# Patient Record
Sex: MALE | Race: BLACK OR AFRICAN AMERICAN | NOT HISPANIC OR LATINO
[De-identification: names, ages, dates, MRNs, and addresses within clinical notes are randomized per-mention and may not be internally consistent; named-entity substitution may affect disease eponyms.]

---

## 2018-01-30 PROBLEM — Z00.00 ENCOUNTER FOR PREVENTIVE HEALTH EXAMINATION: Status: ACTIVE | Noted: 2018-01-30

## 2018-02-07 ENCOUNTER — FORM ENCOUNTER (OUTPATIENT)
Age: 48
End: 2018-02-07

## 2018-02-08 ENCOUNTER — OUTPATIENT (OUTPATIENT)
Dept: OUTPATIENT SERVICES | Facility: HOSPITAL | Age: 48
LOS: 1 days | End: 2018-02-08
Payer: COMMERCIAL

## 2018-02-08 ENCOUNTER — APPOINTMENT (OUTPATIENT)
Dept: ORTHOPEDIC SURGERY | Facility: CLINIC | Age: 48
End: 2018-02-08
Payer: COMMERCIAL

## 2018-02-08 VITALS — HEIGHT: 69 IN | WEIGHT: 239 LBS | BODY MASS INDEX: 35.4 KG/M2

## 2018-02-08 DIAGNOSIS — Z72.0 TOBACCO USE: ICD-10-CM

## 2018-02-08 DIAGNOSIS — Z72.3 LACK OF PHYSICAL EXERCISE: ICD-10-CM

## 2018-02-08 PROCEDURE — 72020 X-RAY EXAM OF SPINE 1 VIEW: CPT | Mod: 26

## 2018-02-08 PROCEDURE — 73502 X-RAY EXAM HIP UNI 2-3 VIEWS: CPT

## 2018-02-08 PROCEDURE — 73502 X-RAY EXAM HIP UNI 2-3 VIEWS: CPT | Mod: 26,LT

## 2018-02-08 PROCEDURE — 99204 OFFICE O/P NEW MOD 45 MIN: CPT

## 2018-02-08 PROCEDURE — 72020 X-RAY EXAM OF SPINE 1 VIEW: CPT

## 2018-03-21 ENCOUNTER — APPOINTMENT (OUTPATIENT)
Dept: HEART AND VASCULAR | Facility: CLINIC | Age: 48
End: 2018-03-21

## 2018-07-08 ENCOUNTER — FORM ENCOUNTER (OUTPATIENT)
Age: 48
End: 2018-07-08

## 2019-01-17 ENCOUNTER — FORM ENCOUNTER (OUTPATIENT)
Age: 49
End: 2019-01-17

## 2019-01-18 ENCOUNTER — APPOINTMENT (OUTPATIENT)
Dept: ORTHOPEDIC SURGERY | Facility: CLINIC | Age: 49
End: 2019-01-18
Payer: COMMERCIAL

## 2019-01-18 ENCOUNTER — OUTPATIENT (OUTPATIENT)
Dept: OUTPATIENT SERVICES | Facility: HOSPITAL | Age: 49
LOS: 1 days | End: 2019-01-18
Payer: COMMERCIAL

## 2019-01-18 VITALS — HEIGHT: 69 IN | BODY MASS INDEX: 37.33 KG/M2 | WEIGHT: 252 LBS

## 2019-01-18 DIAGNOSIS — E66.9 OBESITY, UNSPECIFIED: ICD-10-CM

## 2019-01-18 DIAGNOSIS — Z71.6 TOBACCO ABUSE COUNSELING: ICD-10-CM

## 2019-01-18 DIAGNOSIS — M16.11 UNILATERAL PRIMARY OSTEOARTHRITIS, RIGHT HIP: ICD-10-CM

## 2019-01-18 DIAGNOSIS — M16.12 UNILATERAL PRIMARY OSTEOARTHRITIS, LEFT HIP: ICD-10-CM

## 2019-01-18 PROCEDURE — 73502 X-RAY EXAM HIP UNI 2-3 VIEWS: CPT

## 2019-01-18 PROCEDURE — 99214 OFFICE O/P EST MOD 30 MIN: CPT

## 2019-01-18 PROCEDURE — 73502 X-RAY EXAM HIP UNI 2-3 VIEWS: CPT | Mod: 26,LT

## 2019-01-18 NOTE — DISCUSSION/SUMMARY
[de-identified] : Patient has progressively severe hip pain and disability secondary to DJD and has failed extensive conservative care including activity modification, analgesic medications and therapeutic exercise. The patient was indicated for left total hip replacement.\par We discussed the details of the procedure, the expected recovery period, and the expected outcome. Bearing surface and fixation options were discussed, along with surgical approaches. For this patient I recommended a posterior approach to ensure adequate surgical exposure.\par We discussed the likelihood of satisfaction after complete recovery, and the potential causes of dissatisfaction. Specific risks of total hip replacement were discussed in detail. We discussed the risk of surgical site complications including but not limited to: surgical site infection, wound healing complications, bone fracture, prosthetic hip dislocation, neurovascular injury, hemorrhage, limb length inequality, persistent pain and need for reoperation. We discussed surgical blood loss and the possible need for blood transfusion. We discussed the risk of perioperative medical complications, including but not limited to catheter-associated urinary tract infection, venous thromboembolism and other cardiopulmonary complications. We discussed anesthetic options and the risk of anesthesia-related complications. We discussed the durability of prosthetic hips and limitations related to wear, osteolysis and loosening. The patient was given a copy of my preoperative packet with additional information about the procedure.\par \par Patient has been trying to quit smoking and I encouraged him not to keep cigarettes in the house. I will test for nicotine before the surgery and told him I won't operate if his nicotine level is high. I recommended he schedule surgery after being nicotine free for several weeks to months. \par \par The patient gave informed consent for the surgical procedure and was instructed to speak to my surgical coordinator to arrange the logistics of surgical scheduling, presurgical testing, and medical optimization and clearance.

## 2019-01-18 NOTE — PHYSICAL EXAM
[de-identified] : Constitutional: Well appearing. No acute distress.\par Mental Status: Alert & oriented to person, place and time. Normal affect.\par Pulmonary: No respiratory distress. Normal chest excursion.\par \par Gait: Severely coxalgic\par Ambulatory assist devices: Cane.\par \par Cervical spine: Skin intact. No visible deformity. Painless active ROM without evident restriction.\par Bilateral upper extremities: Skin intact. No deformity. Painless active ROM without evident restriction.\par Thoracolumbar spine: No deformity. No tenderness. No radicular pain on passive straight leg raise bilaterally.\par Pelvis: No pelvic obliquity. No tenderness.\par Leg lengths: Left a few mm shorter than right.\par \par Right Hip:\par Skin intact.\par No surgical scars.\par No erythema.\par No ecchymosis.\par No swelling.\par No deformity.\par No focal tenderness.\par Painful and restricted range of motion. ROM from full extension to 75 degrees of flexion. 0 degrees of internal rotation. 20 degrees of external rotation. 10 degrees of abduction. 0 degrees of adduction.\par No crepitation.\par No instability.\par PILAR painful.\par Impingement (FADIR) painful.\par Stinchfield painful.\par Flexor power 4+/5\par \par Left Hip:\par Skin intact.\par No surgical scars.\par No erythema.\par No ecchymosis.\par No swelling.\par No deformity.\par No focal tenderness.\par Painful and restricted range of motion. ROM from full extension to 60 degrees of flexion. 10 degrees of obligate external rotation to further 15 degrees of external rotation. 10 degrees of abduction. 0 degrees of adduction.\par No crepitation.\par No instability.\par PILAR painful.\par Impingement (FADIR) painful.\par Stinchfield painful.\par Flexor power 5/5.\par \par Bilateral Knees: Skin intact. No surgical scars. No erythema or ecchymosis. No swelling or effusion. No deformity. No focal tenderness. Painless and unrestricted range of motion. Central patellar tracking. No crepitation. No instability. Quadriceps 5-/5 on right and 4+/5 on left.\par \par Neurological: Intact distal crude touch sensation. Normal distal motor power. \par Cardiovascular: Palpable dorsalis pedis and posterior tibialis pulses. Brisk capillary refill. No peripheral edema.\par Lymphatics: No peripheral adenopathy appreciated. 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

## 2019-01-18 NOTE — REASON FOR VISIT
Normal rate, regular rhythm, normal S1, S2 heart sounds heard. [_______] : lincoln CORREA  for [Hip Pain] : hip pain

## 2019-01-18 NOTE — ADDENDUM
[FreeTextEntry1] : This note was written by Mitzi King on 01/18/2019 acting as scribe for Dr. Costello and JEREMY Cavanaugh.\par \par \par \par

## 2019-01-18 NOTE — HISTORY OF PRESENT ILLNESS
[Worsening] : worsening [___ yrs] : [unfilled] year(s) ago [Standing] : standing [Lifting] : lifting [Constant] : ~He/She~ states the symptoms seem to be constant [Hip Movement] : worsened by hip movement [Sitting] : worsened by sitting [Walking] : worsened by walking [de-identified] : 49 y/o M smoker presents today for follow up of left hip pain. He reports marked pain that seriously limits his activity. Patient experiences pain daily that makes it difficult for him to place shoes on the left foot. He is able to walk less than 5 blocks with a severe limp and has difficulty using stairs. Patient uses a cane for long walks and reports that he is unable to sit comfortably in any chair. He has been taking Advil and Ibuprofen daily to manage pain.\par At his last visit in February of 2018, patient was told to cease nicotine use before the operation and he reports that he has reduced his smoking to two cigarettes daily.

## 2019-05-14 ENCOUNTER — OTHER (OUTPATIENT)
Age: 49
End: 2019-05-14

## 2019-05-19 ENCOUNTER — FORM ENCOUNTER (OUTPATIENT)
Age: 49
End: 2019-05-19

## 2019-05-20 ENCOUNTER — APPOINTMENT (OUTPATIENT)
Dept: CT IMAGING | Facility: HOSPITAL | Age: 49
End: 2019-05-20
Payer: COMMERCIAL

## 2019-05-20 ENCOUNTER — OUTPATIENT (OUTPATIENT)
Dept: OUTPATIENT SERVICES | Facility: HOSPITAL | Age: 49
LOS: 1 days | End: 2019-05-20
Payer: COMMERCIAL

## 2019-05-20 DIAGNOSIS — Z22.321 CARRIER OR SUSPECTED CARRIER OF METHICILLIN SUSCEPTIBLE STAPHYLOCOCCUS AUREUS: ICD-10-CM

## 2019-05-20 LAB
MRSA PCR RESULT.: NEGATIVE — SIGNIFICANT CHANGE UP
S AUREUS DNA NOSE QL NAA+PROBE: POSITIVE

## 2019-05-20 PROCEDURE — 73700 CT LOWER EXTREMITY W/O DYE: CPT

## 2019-05-20 PROCEDURE — 73700 CT LOWER EXTREMITY W/O DYE: CPT | Mod: 26,LT

## 2019-05-20 PROCEDURE — 87641 MR-STAPH DNA AMP PROBE: CPT

## 2019-06-04 ENCOUNTER — RESULT REVIEW (OUTPATIENT)
Age: 49
End: 2019-06-04

## 2019-06-04 VITALS
TEMPERATURE: 98 F | OXYGEN SATURATION: 99 % | RESPIRATION RATE: 18 BRPM | HEIGHT: 69 IN | WEIGHT: 248.9 LBS | HEART RATE: 84 BPM | SYSTOLIC BLOOD PRESSURE: 145 MMHG | DIASTOLIC BLOOD PRESSURE: 95 MMHG

## 2019-06-04 NOTE — H&P ADULT - NSHPPHYSICALEXAM_GEN_ALL_CORE
MSK:  decreased range of motion left hip 2/2 pain  Remainder of physical exam as per medical clearance note MSK: Skin warm and well perfused. DP pulses palpable bilateral lower extremities. Sensation intact and equal bilateral lower extremities. EHL/TA/GS/FHL 5/5 bilateral lower extremities.   decreased range of motion left hip 2/2 pain  Remainder of physical exam as per medical clearance note

## 2019-06-04 NOTE — H&P ADULT - NSHPLABSRESULTS_GEN_ALL_CORE
preop CBC/CMP/PT/INR/PTT/UA/urine culture - wnl reviewed by medical clearance  Preop EKG: NSR, reviewed by medical clearance.   Echocardiogram transthoracic: unremarkable study, reviewed by cardiology clearance  Nares swab: +MSSA preop CBC/CMP/PT/INR/PTT/UA/urine culture - wnl reviewed by medical clearance  Preop EKG: NSR, reviewed by medical clearance.   Echocardiogram transthoracic: unremarkable study, reviewed by cardiology clearance  Nares swab: +MSSA - pt completed 5 day treatment w/ mupirocin ointment

## 2019-06-04 NOTE — H&P ADULT - PROBLEM SELECTOR PLAN 1
Admit to Orthopaedic Service.  Presents today for elective left total hip replacement   Pt medically stable and cleared for procedure today by  Admit to Orthopaedic Service.  Presents today for elective left total hip replacement   Pt medically stable and cleared for procedure today by Dr. Marcial; cardiology clearance per Dr. Yo

## 2019-06-04 NOTE — H&P ADULT - HISTORY OF PRESENT ILLNESS
49 year old male presents with left hip pain x   presents today for elective left total hip replacement, robotic assisted 49 year old male presents with left hip pain x 3-4 years without preceding accident, injury or trauma. The patient complains of pain which radiates down his left lower extremity and is associated with numbness/tingling of the LLE. Per chart review, the patient has a hx of a L hip SCFE. The patient occasionally ambulates with assistance of a cane. The patient failed conservative therapies for his symptoms. Occasionally takes aleve as needed for her pain. Denies hx of DVT.   presents today for elective left total hip replacement, robotic assisted

## 2019-06-05 ENCOUNTER — INPATIENT (INPATIENT)
Facility: HOSPITAL | Age: 49
LOS: 0 days | Discharge: HOME HEALTH | DRG: 470 | End: 2019-06-06
Attending: ORTHOPAEDIC SURGERY | Admitting: ORTHOPAEDIC SURGERY
Payer: COMMERCIAL

## 2019-06-05 ENCOUNTER — APPOINTMENT (OUTPATIENT)
Dept: ORTHOPEDIC SURGERY | Facility: HOSPITAL | Age: 49
End: 2019-06-05

## 2019-06-05 ENCOUNTER — MEDICATION RENEWAL (OUTPATIENT)
Age: 49
End: 2019-06-05

## 2019-06-05 DIAGNOSIS — M19.90 UNSPECIFIED OSTEOARTHRITIS, UNSPECIFIED SITE: ICD-10-CM

## 2019-06-05 LAB
ANION GAP SERPL CALC-SCNC: 9 MMOL/L — SIGNIFICANT CHANGE UP (ref 5–17)
BUN SERPL-MCNC: 15 MG/DL — SIGNIFICANT CHANGE UP (ref 7–23)
CALCIUM SERPL-MCNC: 9.2 MG/DL — SIGNIFICANT CHANGE UP (ref 8.4–10.5)
CHLORIDE SERPL-SCNC: 103 MMOL/L — SIGNIFICANT CHANGE UP (ref 96–108)
CO2 SERPL-SCNC: 27 MMOL/L — SIGNIFICANT CHANGE UP (ref 22–31)
CREAT SERPL-MCNC: 1.06 MG/DL — SIGNIFICANT CHANGE UP (ref 0.5–1.3)
GLUCOSE SERPL-MCNC: 93 MG/DL — SIGNIFICANT CHANGE UP (ref 70–99)
HCT VFR BLD CALC: 39.9 % — SIGNIFICANT CHANGE UP (ref 39–50)
HGB BLD-MCNC: 11.7 G/DL — LOW (ref 13–17)
MCHC RBC-ENTMCNC: 28.1 PG — SIGNIFICANT CHANGE UP (ref 27–34)
MCHC RBC-ENTMCNC: 29.3 GM/DL — LOW (ref 32–36)
MCV RBC AUTO: 95.7 FL — SIGNIFICANT CHANGE UP (ref 80–100)
NRBC # BLD: 0 /100 WBCS — SIGNIFICANT CHANGE UP (ref 0–0)
PLATELET # BLD AUTO: 239 K/UL — SIGNIFICANT CHANGE UP (ref 150–400)
POTASSIUM SERPL-MCNC: 3.7 MMOL/L — SIGNIFICANT CHANGE UP (ref 3.5–5.3)
POTASSIUM SERPL-SCNC: 3.7 MMOL/L — SIGNIFICANT CHANGE UP (ref 3.5–5.3)
RBC # BLD: 4.17 M/UL — LOW (ref 4.2–5.8)
RBC # FLD: 14.1 % — SIGNIFICANT CHANGE UP (ref 10.3–14.5)
SODIUM SERPL-SCNC: 139 MMOL/L — SIGNIFICANT CHANGE UP (ref 135–145)
WBC # BLD: 13.22 K/UL — HIGH (ref 3.8–10.5)
WBC # FLD AUTO: 13.22 K/UL — HIGH (ref 3.8–10.5)

## 2019-06-05 PROCEDURE — 20985 CPTR-ASST DIR MS PX: CPT

## 2019-06-05 PROCEDURE — 27130 TOTAL HIP ARTHROPLASTY: CPT | Mod: LT

## 2019-06-05 PROCEDURE — 72170 X-RAY EXAM OF PELVIS: CPT | Mod: 26

## 2019-06-05 RX ORDER — CEFAZOLIN SODIUM 1 G
2000 VIAL (EA) INJECTION EVERY 8 HOURS
Refills: 0 | Status: DISCONTINUED | OUTPATIENT
Start: 2019-06-05 | End: 2019-06-05

## 2019-06-05 RX ORDER — SENNA PLUS 8.6 MG/1
2 TABLET ORAL AT BEDTIME
Refills: 0 | Status: DISCONTINUED | OUTPATIENT
Start: 2019-06-05 | End: 2019-06-06

## 2019-06-05 RX ORDER — CELECOXIB 200 MG/1
200 CAPSULE ORAL
Refills: 0 | Status: DISCONTINUED | OUTPATIENT
Start: 2019-06-07 | End: 2019-06-06

## 2019-06-05 RX ORDER — AMLODIPINE BESYLATE 2.5 MG/1
5 TABLET ORAL DAILY
Refills: 0 | Status: DISCONTINUED | OUTPATIENT
Start: 2019-06-05 | End: 2019-06-06

## 2019-06-05 RX ORDER — CEFAZOLIN SODIUM 1 G
2000 VIAL (EA) INJECTION EVERY 8 HOURS
Refills: 0 | Status: COMPLETED | OUTPATIENT
Start: 2019-06-05 | End: 2019-06-06

## 2019-06-05 RX ORDER — KETOROLAC TROMETHAMINE 30 MG/ML
15 SYRINGE (ML) INJECTION EVERY 6 HOURS
Refills: 0 | Status: DISCONTINUED | OUTPATIENT
Start: 2019-06-05 | End: 2019-06-06

## 2019-06-05 RX ORDER — OLMESARTAN MEDOXOMIL 5 MG/1
1 TABLET, FILM COATED ORAL
Qty: 0 | Refills: 0 | DISCHARGE

## 2019-06-05 RX ORDER — OXYCODONE HYDROCHLORIDE 5 MG/1
5 TABLET ORAL EVERY 4 HOURS
Refills: 0 | Status: DISCONTINUED | OUTPATIENT
Start: 2019-06-05 | End: 2019-06-06

## 2019-06-05 RX ORDER — ACETAMINOPHEN 500 MG
650 TABLET ORAL EVERY 6 HOURS
Refills: 0 | Status: DISCONTINUED | OUTPATIENT
Start: 2019-06-05 | End: 2019-06-06

## 2019-06-05 RX ORDER — MORPHINE SULFATE 50 MG/1
4 CAPSULE, EXTENDED RELEASE ORAL EVERY 4 HOURS
Refills: 0 | Status: DISCONTINUED | OUTPATIENT
Start: 2019-06-05 | End: 2019-06-06

## 2019-06-05 RX ORDER — APREPITANT 80 MG/1
40 CAPSULE ORAL ONCE
Refills: 0 | Status: COMPLETED | OUTPATIENT
Start: 2019-06-05 | End: 2019-06-05

## 2019-06-05 RX ORDER — ASPIRIN/CALCIUM CARB/MAGNESIUM 324 MG
325 TABLET ORAL
Refills: 0 | Status: DISCONTINUED | OUTPATIENT
Start: 2019-06-05 | End: 2019-06-06

## 2019-06-05 RX ORDER — SODIUM CHLORIDE 9 MG/ML
1000 INJECTION, SOLUTION INTRAVENOUS
Refills: 0 | Status: DISCONTINUED | OUTPATIENT
Start: 2019-06-05 | End: 2019-06-06

## 2019-06-05 RX ORDER — ACETAMINOPHEN 500 MG
1000 TABLET ORAL ONCE
Refills: 0 | Status: COMPLETED | OUTPATIENT
Start: 2019-06-05 | End: 2019-06-05

## 2019-06-05 RX ORDER — CELECOXIB 200 MG/1
400 CAPSULE ORAL ONCE
Refills: 0 | Status: COMPLETED | OUTPATIENT
Start: 2019-06-05 | End: 2019-06-05

## 2019-06-05 RX ORDER — LOSARTAN POTASSIUM 100 MG/1
100 TABLET, FILM COATED ORAL DAILY
Refills: 0 | Status: DISCONTINUED | OUTPATIENT
Start: 2019-06-05 | End: 2019-06-06

## 2019-06-05 RX ORDER — AMLODIPINE BESYLATE 2.5 MG/1
1 TABLET ORAL
Qty: 0 | Refills: 0 | DISCHARGE

## 2019-06-05 RX ORDER — ONDANSETRON 8 MG/1
4 TABLET, FILM COATED ORAL EVERY 6 HOURS
Refills: 0 | Status: DISCONTINUED | OUTPATIENT
Start: 2019-06-05 | End: 2019-06-06

## 2019-06-05 RX ORDER — MORPHINE SULFATE 50 MG/1
4 CAPSULE, EXTENDED RELEASE ORAL
Refills: 0 | Status: DISCONTINUED | OUTPATIENT
Start: 2019-06-05 | End: 2019-06-06

## 2019-06-05 RX ORDER — POLYETHYLENE GLYCOL 3350 17 G/17G
17 POWDER, FOR SOLUTION ORAL DAILY
Refills: 0 | Status: DISCONTINUED | OUTPATIENT
Start: 2019-06-05 | End: 2019-06-06

## 2019-06-05 RX ORDER — BUPIVACAINE 13.3 MG/ML
20 INJECTION, SUSPENSION, LIPOSOMAL INFILTRATION ONCE
Refills: 0 | Status: DISCONTINUED | OUTPATIENT
Start: 2019-06-05 | End: 2019-06-06

## 2019-06-05 RX ORDER — DOCUSATE SODIUM 100 MG
100 CAPSULE ORAL THREE TIMES A DAY
Refills: 0 | Status: DISCONTINUED | OUTPATIENT
Start: 2019-06-05 | End: 2019-06-06

## 2019-06-05 RX ORDER — PANTOPRAZOLE SODIUM 20 MG/1
40 TABLET, DELAYED RELEASE ORAL
Refills: 0 | Status: DISCONTINUED | OUTPATIENT
Start: 2019-06-05 | End: 2019-06-06

## 2019-06-05 RX ORDER — OXYCODONE HYDROCHLORIDE 5 MG/1
10 TABLET ORAL EVERY 4 HOURS
Refills: 0 | Status: DISCONTINUED | OUTPATIENT
Start: 2019-06-05 | End: 2019-06-06

## 2019-06-05 RX ADMIN — APREPITANT 40 MILLIGRAM(S): 80 CAPSULE ORAL at 10:35

## 2019-06-05 RX ADMIN — AMLODIPINE BESYLATE 5 MILLIGRAM(S): 2.5 TABLET ORAL at 20:53

## 2019-06-05 RX ADMIN — Medication 2000 MILLIGRAM(S): at 22:02

## 2019-06-05 RX ADMIN — LOSARTAN POTASSIUM 100 MILLIGRAM(S): 100 TABLET, FILM COATED ORAL at 20:54

## 2019-06-05 RX ADMIN — Medication 325 MILLIGRAM(S): at 22:02

## 2019-06-05 RX ADMIN — OXYCODONE HYDROCHLORIDE 10 MILLIGRAM(S): 5 TABLET ORAL at 22:06

## 2019-06-05 RX ADMIN — OXYCODONE HYDROCHLORIDE 10 MILLIGRAM(S): 5 TABLET ORAL at 06:34

## 2019-06-05 RX ADMIN — CELECOXIB 400 MILLIGRAM(S): 200 CAPSULE ORAL at 10:35

## 2019-06-05 RX ADMIN — Medication 100 MILLIGRAM(S): at 22:02

## 2019-06-05 RX ADMIN — Medication 1000 MILLIGRAM(S): at 10:35

## 2019-06-05 NOTE — PROGRESS NOTE ADULT - SUBJECTIVE AND OBJECTIVE BOX
Orthopedics Post Op Check    Procedure: LEFT THR  Surgeon: SHIVANI    Pt. stable, laying comfortably in bed. Denies CP, SOB, N/V, numbness/tingling in b/l les.     Vital Signs Last 24 Hrs  T(C): 36.1 (05 Jun 2019 17:20), Max: 36.1 (05 Jun 2019 15:20)  T(F): 97 (05 Jun 2019 17:20), Max: 97 (05 Jun 2019 15:20)  HR: 68 (05 Jun 2019 17:20) (60 - 70)  BP: 159/86 (05 Jun 2019 17:20) (148/78 - 171/88)  BP(mean): 114 (05 Jun 2019 17:20) (105 - 123)  RR: 20 (05 Jun 2019 17:20) (8 - 20)  SpO2: 98% (05 Jun 2019 17:20) (98% - 100%)      Dressing C/D/I    Pulses: DP/PT 2+B/L LES  SLT: intact   B/L LES  Motor:  EHL/FHL/TA/GS  5/5 b/l les    Post op XR: prosthesis in place     A/P: 49yoMale POD#0 s/p LEFT THR POSTERIOR  - Stable  - Pain Control  - DVT ppx: ASA  - Post op abx:ANCEF  - PT, WBS: WBAT WITH POSTERIOR HIP PRECAUTIONS  - F/U AM Labs

## 2019-06-05 NOTE — PHYSICAL THERAPY INITIAL EVALUATION ADULT - PLANNED THERAPY INTERVENTIONS, PT EVAL
balance training/transfer training/bed mobility training/gait training/postural re-education/strengthening/neuromuscular re-education

## 2019-06-05 NOTE — PHYSICAL THERAPY INITIAL EVALUATION ADULT - CRITERIA FOR SKILLED THERAPEUTIC INTERVENTIONS
predicted duration of therapy intervention/anticipated equipment needs at discharge/anticipated discharge recommendation/impairments found/therapy frequency/functional limitations in following categories/risk reduction/prevention

## 2019-06-05 NOTE — PHYSICAL THERAPY INITIAL EVALUATION ADULT - PERTINENT HX OF CURRENT PROBLEM, REHAB EVAL
49 year old male presents with left hip pain x 3-4 years without preceding accident, injury or trauma. The patient complains of pain which radiates down his left lower extremity and is associated with numbness/tingling of the LLE. Per chart review, the patient has a hx of a L hip SCFE. The patient occasionally ambulates with assistance of a cane. The patient failed conservative therapies for his symptoms. Occasionally takes aleve as needed for her pain. Denies hx of DVT.

## 2019-06-05 NOTE — PHYSICAL THERAPY INITIAL EVALUATION ADULT - ADDITIONAL COMMENTS
Patient lives with spouse with 23y.o son in apartment building with no steps. Sometimes uses SC. Denies Hx of falls.

## 2019-06-05 NOTE — PHYSICAL THERAPY INITIAL EVALUATION ADULT - GENERAL OBSERVATIONS, REHAB EVAL
Patient received semi-supine in no acute distress c/o of headache, +IV, +SCDs, +Abduction pillow, +O2. Cleared by JED Yepez .

## 2019-06-06 ENCOUNTER — TRANSCRIPTION ENCOUNTER (OUTPATIENT)
Age: 49
End: 2019-06-06

## 2019-06-06 VITALS — DIASTOLIC BLOOD PRESSURE: 69 MMHG | HEART RATE: 72 BPM | SYSTOLIC BLOOD PRESSURE: 149 MMHG

## 2019-06-06 LAB
ANION GAP SERPL CALC-SCNC: 11 MMOL/L — SIGNIFICANT CHANGE UP (ref 5–17)
BUN SERPL-MCNC: 16 MG/DL — SIGNIFICANT CHANGE UP (ref 7–23)
CALCIUM SERPL-MCNC: 9 MG/DL — SIGNIFICANT CHANGE UP (ref 8.4–10.5)
CHLORIDE SERPL-SCNC: 101 MMOL/L — SIGNIFICANT CHANGE UP (ref 96–108)
CO2 SERPL-SCNC: 24 MMOL/L — SIGNIFICANT CHANGE UP (ref 22–31)
CREAT SERPL-MCNC: 0.95 MG/DL — SIGNIFICANT CHANGE UP (ref 0.5–1.3)
GLUCOSE SERPL-MCNC: 113 MG/DL — HIGH (ref 70–99)
HCT VFR BLD CALC: 35.2 % — LOW (ref 39–50)
HGB BLD-MCNC: 10.7 G/DL — LOW (ref 13–17)
MCHC RBC-ENTMCNC: 28.3 PG — SIGNIFICANT CHANGE UP (ref 27–34)
MCHC RBC-ENTMCNC: 30.4 GM/DL — LOW (ref 32–36)
MCV RBC AUTO: 93.1 FL — SIGNIFICANT CHANGE UP (ref 80–100)
NRBC # BLD: 0 /100 WBCS — SIGNIFICANT CHANGE UP (ref 0–0)
PLATELET # BLD AUTO: 247 K/UL — SIGNIFICANT CHANGE UP (ref 150–400)
POTASSIUM SERPL-MCNC: 4.1 MMOL/L — SIGNIFICANT CHANGE UP (ref 3.5–5.3)
POTASSIUM SERPL-SCNC: 4.1 MMOL/L — SIGNIFICANT CHANGE UP (ref 3.5–5.3)
RBC # BLD: 3.78 M/UL — LOW (ref 4.2–5.8)
RBC # FLD: 14 % — SIGNIFICANT CHANGE UP (ref 10.3–14.5)
SODIUM SERPL-SCNC: 136 MMOL/L — SIGNIFICANT CHANGE UP (ref 135–145)
WBC # BLD: 12.1 K/UL — HIGH (ref 3.8–10.5)
WBC # FLD AUTO: 12.1 K/UL — HIGH (ref 3.8–10.5)

## 2019-06-06 PROCEDURE — 99223 1ST HOSP IP/OBS HIGH 75: CPT

## 2019-06-06 PROCEDURE — 99222 1ST HOSP IP/OBS MODERATE 55: CPT

## 2019-06-06 RX ORDER — ASPIRIN/CALCIUM CARB/MAGNESIUM 324 MG
1 TABLET ORAL
Qty: 0 | Refills: 0 | DISCHARGE
Start: 2019-06-06

## 2019-06-06 RX ORDER — AMLODIPINE BESYLATE 2.5 MG/1
5 TABLET ORAL ONCE
Refills: 0 | Status: COMPLETED | OUTPATIENT
Start: 2019-06-06 | End: 2019-06-06

## 2019-06-06 RX ORDER — AMLODIPINE BESYLATE 2.5 MG/1
1 TABLET ORAL
Qty: 30 | Refills: 0
Start: 2019-06-06 | End: 2019-07-05

## 2019-06-06 RX ORDER — AMLODIPINE BESYLATE 2.5 MG/1
1 TABLET ORAL
Qty: 0 | Refills: 0 | DISCHARGE

## 2019-06-06 RX ORDER — DOCUSATE SODIUM 100 MG
1 CAPSULE ORAL
Qty: 0 | Refills: 0 | DISCHARGE
Start: 2019-06-06

## 2019-06-06 RX ORDER — CELECOXIB 200 MG/1
1 CAPSULE ORAL
Qty: 0 | Refills: 0 | DISCHARGE
Start: 2019-06-06

## 2019-06-06 RX ORDER — AMLODIPINE BESYLATE 2.5 MG/1
10 TABLET ORAL DAILY
Refills: 0 | Status: DISCONTINUED | OUTPATIENT
Start: 2019-06-07 | End: 2019-06-06

## 2019-06-06 RX ORDER — POLYETHYLENE GLYCOL 3350 17 G/17G
17 POWDER, FOR SOLUTION ORAL
Qty: 0 | Refills: 0 | DISCHARGE
Start: 2019-06-06

## 2019-06-06 RX ORDER — SENNA PLUS 8.6 MG/1
2 TABLET ORAL
Qty: 0 | Refills: 0 | DISCHARGE
Start: 2019-06-06

## 2019-06-06 RX ORDER — PANTOPRAZOLE SODIUM 20 MG/1
1 TABLET, DELAYED RELEASE ORAL
Qty: 0 | Refills: 0 | DISCHARGE
Start: 2019-06-06

## 2019-06-06 RX ADMIN — Medication 100 MILLIGRAM(S): at 05:27

## 2019-06-06 RX ADMIN — OXYCODONE HYDROCHLORIDE 10 MILLIGRAM(S): 5 TABLET ORAL at 05:34

## 2019-06-06 RX ADMIN — Medication 15 MILLIGRAM(S): at 00:18

## 2019-06-06 RX ADMIN — OXYCODONE HYDROCHLORIDE 10 MILLIGRAM(S): 5 TABLET ORAL at 15:56

## 2019-06-06 RX ADMIN — Medication 100 MILLIGRAM(S): at 12:53

## 2019-06-06 RX ADMIN — Medication 15 MILLIGRAM(S): at 12:53

## 2019-06-06 RX ADMIN — Medication 2000 MILLIGRAM(S): at 05:27

## 2019-06-06 RX ADMIN — Medication 325 MILLIGRAM(S): at 05:27

## 2019-06-06 RX ADMIN — AMLODIPINE BESYLATE 5 MILLIGRAM(S): 2.5 TABLET ORAL at 05:27

## 2019-06-06 RX ADMIN — Medication 15 MILLIGRAM(S): at 05:28

## 2019-06-06 RX ADMIN — AMLODIPINE BESYLATE 5 MILLIGRAM(S): 2.5 TABLET ORAL at 15:21

## 2019-06-06 RX ADMIN — Medication 15 MILLIGRAM(S): at 01:18

## 2019-06-06 RX ADMIN — POLYETHYLENE GLYCOL 3350 17 GRAM(S): 17 POWDER, FOR SOLUTION ORAL at 12:53

## 2019-06-06 RX ADMIN — OXYCODONE HYDROCHLORIDE 10 MILLIGRAM(S): 5 TABLET ORAL at 10:44

## 2019-06-06 RX ADMIN — PANTOPRAZOLE SODIUM 40 MILLIGRAM(S): 20 TABLET, DELAYED RELEASE ORAL at 05:27

## 2019-06-06 RX ADMIN — LOSARTAN POTASSIUM 100 MILLIGRAM(S): 100 TABLET, FILM COATED ORAL at 05:27

## 2019-06-06 NOTE — PROGRESS NOTE ADULT - SUBJECTIVE AND OBJECTIVE BOX
S: Patient seen and examined. Doing well this AM. Pain reported but controlled. No acute events overnight.    O:   Vital Signs Last 24 Hrs  T(C): 36.4 (06 Jun 2019 04:20), Max: 36.7 (06 Jun 2019 00:29)  T(F): 97.5 (06 Jun 2019 04:20), Max: 98.1 (06 Jun 2019 00:29)  HR: 71 (06 Jun 2019 04:20) (60 - 90)  BP: 143/81 (06 Jun 2019 04:20) (143/78 - 200/97)  BP(mean): 114 (05 Jun 2019 17:20) (105 - 123)  RR: 16 (06 Jun 2019 04:20) (8 - 20)  SpO2: 98% (06 Jun 2019 04:20) (96% - 100%)    Motor: intact GS/TA/EHL/FHL BL   SILT to patients baseline BL  WWP DP PT BL  Dressing C/D/I

## 2019-06-06 NOTE — DISCHARGE NOTE PROVIDER - NSDCFUADDAPPT_GEN_ALL_CORE_FT
Follow up with your primary care doctor regarding your high blood pressure and your medication change. Amlodipine (Norvasc was changed to 10mg daily).

## 2019-06-06 NOTE — CONSULT NOTE ADULT - SUBJECTIVE AND OBJECTIVE BOX
CC: No overnight events, no complaints.   Feeling well, pain is overall controlled.  Tolerates PO diet (+); Urination (+); Walked with PT (+)  Denies cp, sob, dizziness, HA, abdominal pain, n/v.  Rest of ROS negative.     Vital Signs Last 24 Hrs  T(C): 36.3 (06 Jun 2019 15:28), Max: 36.7 (06 Jun 2019 00:29)  T(F): 97.3 (06 Jun 2019 15:28), Max: 98.1 (06 Jun 2019 00:29)  HR: 89 (06 Jun 2019 15:28) (68 - 90)  BP: 160/79 (06 Jun 2019 15:28) (134/79 - 200/97)  BP(mean): 114 (05 Jun 2019 17:20) (113 - 114)  RR: 18 (06 Jun 2019 15:28) (16 - 20)  SpO2: 98% (06 Jun 2019 15:28) (96% - 100%)    PHYSICAL EXAMINATION  * General: Obese. Not in acute distress. Awake and alert. Lying comfortably in bed.  * Head: Normocephalic, atraumatic.  * HEENT: ears no discharge, eyes PERRLA, nose no discharge, throat no exudates, normal tonsils.  * Neck: no JVD, supple.  * Lungs: Clear to auscultation, no rales, no wheezes.  * Cardio: Regular rate and rhythm, no murmurs, no rubs, no gallops. Good peripheral pulses.  * Abdomen: Soft, non-tender, non-distended, tympanic to percussion, no rebound, no guarding, no rigidity. Bowel sounds present. No suprapubic or CVA tenderness.  * : Deferred.  * Extremities: Acyanotic, no edema.  * Skin: Warm and dry.  * Neuro: Alert and oriented x 3. No focal deficits. Motor strength is 5/5 throughout. Sensation intact. Cranial nerves II-XII grossly intact.                           10.7   12.10 )-----------( 247      ( 06 Jun 2019 07:11 )             35.2     06-06    136  |  101  |  16  ----------------------------<  113<H>  4.1   |  24  |  0.95    Ca    9.0      06 Jun 2019 07:11    MEDICATIONS  (STANDING):  aspirin enteric coated 325 milliGRAM(s) Oral two times a day  BUpivacaine liposome 1.3% Injectable (no eMAR) 20 milliLiter(s) Local Injection once  docusate sodium 100 milliGRAM(s) Oral three times a day  ketorolac   Injectable 15 milliGRAM(s) IV Push every 6 hours  lactated ringers. 1000 milliLiter(s) (125 mL/Hr) IV Continuous <Continuous>  losartan 100 milliGRAM(s) Oral daily  pantoprazole    Tablet 40 milliGRAM(s) Oral before breakfast  polyethylene glycol 3350 17 Gram(s) Oral daily    MEDICATIONS  (PRN):  acetaminophen   Tablet .. 650 milliGRAM(s) Oral every 6 hours PRN Temp greater or equal to 38C (100.4F), Mild Pain (1 - 3)  aluminum hydroxide/magnesium hydroxide/simethicone Suspension 30 milliLiter(s) Oral four times a day PRN Indigestion  morphine  - Injectable 4 milliGRAM(s) IV Push every 4 hours PRN breakthrough pain  morphine  - Injectable 4 milliGRAM(s) IV Push every 15 minutes PRN breakthrough pain  ondansetron Injectable 4 milliGRAM(s) IV Push every 6 hours PRN Nausea and/or Vomiting  oxyCODONE    IR 5 milliGRAM(s) Oral every 4 hours PRN Moderate Pain (4 - 6)  oxyCODONE    IR 10 milliGRAM(s) Oral every 4 hours PRN Severe Pain (7 - 10)  senna 2 Tablet(s) Oral at bedtime PRN Constipation

## 2019-06-06 NOTE — OCCUPATIONAL THERAPY INITIAL EVALUATION ADULT - MD ORDER
Per chart, 49 year old male presents with left hip pain x 3-4 years without preceding accident, injury or trauma. The patient complains of pain which radiates down his left lower extremity and is associated with numbness/tingling of the LLE. Per chart review, the patient has a hx of a L hip SCFE. The patient occasionally ambulates with assistance of a cane. s/p L posterior RAULITO 6/5/19

## 2019-06-06 NOTE — DISCHARGE NOTE PROVIDER - CARE PROVIDER_API CALL
Osmar Costello)  Orthopaedic Surgery  [address], [address] [place] [address]  Phone: [phone]  Fax: [phone]  Follow Up Time: 2 weeks

## 2019-06-06 NOTE — CONSULT NOTE ADULT - CONSULT REASON
Uncontrolled HTN    48yo M, PMH of HTN, obesity, OA. P/w chronic left hip pain x 3-4 years, failed conservative measures, admitted for elective left total hip replacement, robotic assisted now POD-1. Medicine consulted for uncontrolled HTN.     PAST MEDICAL & SURGICAL HISTORY:  OA (osteoarthritis)  Obesity    Social: Denies etoh, smoking, drugs.     Allergies: NKDA    Home Medications:  aspirin 325 mg oral delayed release tablet: 1 tab(s) orally 2 times a day (06 Jun 2019 15:34)  celecoxib 200 mg oral capsule: 1 cap(s) orally 2 times a day (06 Jun 2019 15:34)  docusate sodium 100 mg oral capsule: 1 cap(s) orally 3 times a day as needed (06 Jun 2019 15:34)  olmesartan 40 mg oral tablet: 1 tab(s) orally once a day (05 Jun 2019 11:53)  pantoprazole 40 mg oral delayed release tablet: 1 tab(s) orally once a day (before a meal) (06 Jun 2019 15:34)  Percocet 5/325 oral tablet: 1 tab(s) orally every 4-6 hours as needed for pain (06 Jun 2019 15:34)  polyethylene glycol 3350 oral powder for reconstitution: 17 gram(s) orally once a day as needed (06 Jun 2019 15:34)  senna oral tablet: 2 tab(s) orally once a day (at bedtime), As needed, Constipation (06 Jun 2019 15:34)

## 2019-06-06 NOTE — OCCUPATIONAL THERAPY INITIAL EVALUATION ADULT - SPECIAL TRAINING, OT EVAL
Patient educated on posterior hip precautions, patient able to verbalize and demo appropriate precautions independently. Patient educated on use of hip kit.

## 2019-06-06 NOTE — OCCUPATIONAL THERAPY INITIAL EVALUATION ADULT - NS ASR OT EQUIP NEEDS DISCH
bedside commode/rolling walker (5 inch wheels)/shower chair, and hip kit. Patient equipment at bedside.

## 2019-06-06 NOTE — PROGRESS NOTE ADULT - SUBJECTIVE AND OBJECTIVE BOX
POST OPERATIVE DAY #: 1  STATUS POST: Left  Posterior hip replacement              SUBJECTIVE: Patient seen and examined. States to mild pain controlled. Patient denies any CP, SOB, fever, chills, numbness/tingling.    Pain:  well controlled      OBJECTIVE:     Vital Signs Last 24 Hrs  T(C): 36.5 (06 Jun 2019 08:40), Max: 36.7 (06 Jun 2019 00:29)  T(F): 97.7 (06 Jun 2019 08:40), Max: 98.1 (06 Jun 2019 00:29)  HR: 68 (06 Jun 2019 08:40) (60 - 90)  BP: 161/81 (06 Jun 2019 08:40) (143/78 - 200/97)  BP(mean): 114 (05 Jun 2019 17:20) (105 - 123)  RR: 16 (06 Jun 2019 08:40) (8 - 20)  SpO2: 99% (06 Jun 2019 08:40) (96% - 100%)    Affected extremity:          Dressing: clean/dry/intact          Sensation: intact to light touch          Motor exam: 5/5 to EHL/TA/GS         warm, well-perfused; capillary refill < 3 seconds              I&O's Detail    05 Jun 2019 07:01  -  06 Jun 2019 07:00  --------------------------------------------------------  IN:    lactated ringers.: 500 mL    Oral Fluid: 220 mL  Total IN: 720 mL    OUT:    Voided: 2450 mL  Total OUT: 2450 mL    Total NET: -1730 mL      06 Jun 2019 07:01  -  06 Jun 2019 11:43  --------------------------------------------------------  IN:    Oral Fluid: 240 mL  Total IN: 240 mL    OUT:    Voided: 400 mL  Total OUT: 400 mL    Total NET: -160 mL          LABS:                        10.7   12.10 )-----------( 247      ( 06 Jun 2019 07:11 )             35.2     06-06    136  |  101  |  16  ----------------------------<  113<H>  4.1   |  24  |  0.95    Ca    9.0      06 Jun 2019 07:11            MEDICATIONS:    acetaminophen   Tablet .. 650 milliGRAM(s) Oral every 6 hours PRN  ketorolac   Injectable 15 milliGRAM(s) IV Push every 6 hours  morphine  - Injectable 4 milliGRAM(s) IV Push every 4 hours PRN  morphine  - Injectable 4 milliGRAM(s) IV Push every 15 minutes PRN  ondansetron Injectable 4 milliGRAM(s) IV Push every 6 hours PRN  oxyCODONE    IR 5 milliGRAM(s) Oral every 4 hours PRN  oxyCODONE    IR 10 milliGRAM(s) Oral every 4 hours PRN    aspirin enteric coated 325 milliGRAM(s) Oral two times a day      RADIOLOGY & ADDITIONAL STUDIES:    ASSESSMENT AND PLAN:     1. Analgesic pain control  2. DVT prophylaxis: ASA   SCDs       3. Continue PT  4. BP increase - continue to monitor, continue home meds  5. Weight Bearing Status:  Weight bearing as tolerated       6. Disposition: Home  when medically and PT cleared

## 2019-06-06 NOTE — OCCUPATIONAL THERAPY INITIAL EVALUATION ADULT - ADDITIONAL COMMENTS
Patient reports being independent in all ADLs and functional mobility w/ occasional use of SC. Patient reports taking a leave of absence from work for surgery, however PTA no difficulties to get to and from work.

## 2019-06-06 NOTE — CONSULT NOTE ADULT - ASSESSMENT
50yo M, PMH of HTN, obesity, OA. P/w chronic left hip pain x 3-4 years, failed conservative measures, admitted for elective left total hip replacement, robotic assisted now POD-1. Medicine consulted for uncontrolled HTN.     1) Uncontrolled HTN.   Given Amlodipine 5mg po this AM.   Given extra 5mg of amlodipine.   Currentlyl BP is 140/80  * Can c/w Amlodipine 10mg po daily starting tomorrow.    2) Post-op state  * OOB-C  * Physical therapy evaluation  * Aggressive incentive spirometry  * Pain control and bowel regimen  * Mechanical LE ppx     3) VTE ppx  *  bid

## 2019-06-06 NOTE — DISCHARGE NOTE PROVIDER - NSDCCPCAREPLAN_GEN_ALL_CORE_FT
PRINCIPAL DISCHARGE DIAGNOSIS  Diagnosis: OA (osteoarthritis)  Assessment and Plan of Treatment: OA (osteoarthritis) of right hip

## 2019-06-06 NOTE — CONSULT NOTE ADULT - SUBJECTIVE AND OBJECTIVE BOX
REASON FOR CONSULT: seen for pre op eval    HISTORY OF PRESENT ILLNESS:  49 year old male presents with left hip pain x 3-4 years without preceding accident, injury or trauma. The patient complains of pain which radiates down his left lower extremity and is associated with numbness/tingling of the LLE. Per chart review, the patient has a hx of a L hip SCFE. The patient occasionally ambulates with assistance of a cane. The patient failed conservative therapies for his symptoms. Occasionally takes aleve as needed for her pain. Denies hx of DVT.   presents today for elective left total hip replacement, robotic assisted     PAST MEDICAL & SURGICAL HISTORY:  OA (osteoarthritis)  Obesity      [ ] Diabetes   [ ] Hypertension  [ ] Hyperlipidemia  [ ] CAD  [ ] PCI  [ ] CABG    PREVIOUS DIAGNOSTIC TESTING:    [ ] Echocardiogram:  [ ]  Catheterization:  [ ] Stress Test:  	    MEDICATIONS:  amLODIPine   Tablet 5 milliGRAM(s) Oral daily  losartan 100 milliGRAM(s) Oral daily        acetaminophen   Tablet .. 650 milliGRAM(s) Oral every 6 hours PRN  ketorolac   Injectable 15 milliGRAM(s) IV Push every 6 hours  morphine  - Injectable 4 milliGRAM(s) IV Push every 4 hours PRN  morphine  - Injectable 4 milliGRAM(s) IV Push every 15 minutes PRN  ondansetron Injectable 4 milliGRAM(s) IV Push every 6 hours PRN  oxyCODONE    IR 5 milliGRAM(s) Oral every 4 hours PRN  oxyCODONE    IR 10 milliGRAM(s) Oral every 4 hours PRN    aluminum hydroxide/magnesium hydroxide/simethicone Suspension 30 milliLiter(s) Oral four times a day PRN  docusate sodium 100 milliGRAM(s) Oral three times a day  pantoprazole    Tablet 40 milliGRAM(s) Oral before breakfast  polyethylene glycol 3350 17 Gram(s) Oral daily  senna 2 Tablet(s) Oral at bedtime PRN      aspirin enteric coated 325 milliGRAM(s) Oral two times a day  lactated ringers. 1000 milliLiter(s) IV Continuous <Continuous>      FAMILY HISTORY:      SOCIAL HISTORY:    [ x] Non-smoker  [ ] Smoker  [ ] Alcohol    FAMILY HX: NC    Allergies    No Known Allergies    Intolerances    	    REVIEW OF SYSTEMS:    [x] as per HPI  CONSTITUTIONAL: No fever, weight loss, or fatigue  ENT:  No difficulty hearing, tinnitus, vertigo; No sinus or throat pain  RESPIRATORY: No cough, wheezing, chills or hemoptysis; No Shortness of Breath  CARDIOVASCULAR: No chest pain, palpitations, dizziness, or leg swelling  GASTROINTESTINAL: No abdominal or epigastric pain. No nausea, vomiting, or hematemesis; No diarrhea or constipation. No melena or hematochezia.  GENITOURINARY: No dysuria, frequency, hematuria, or incontinence  NEUROLOGICAL: No headaches, memory loss, loss of strength, numbness, or tremors  MUSCULOSKELETAL: No joint pain or swelling; No muscle, back, or extremity pain  [x] All others negative	  [ ] Unable to obtain    PHYSICAL EXAM:  T(C): 36.5 (06-06-19 @ 08:40), Max: 36.7 (06-06-19 @ 00:29)  HR: 68 (06-06-19 @ 13:05) (60 - 90)  BP: 173/84 (06-06-19 @ 13:05) (134/79 - 200/97)  RR: 16 (06-06-19 @ 08:40) (8 - 20)  SpO2: 99% (06-06-19 @ 08:40) (96% - 100%)  Wt(kg): --  I&O's Summary    05 Jun 2019 07:01  -  06 Jun 2019 07:00  --------------------------------------------------------  IN: 720 mL / OUT: 2450 mL / NET: -1730 mL    06 Jun 2019 07:01  -  06 Jun 2019 13:24  --------------------------------------------------------  IN: 240 mL / OUT: 400 mL / NET: -160 mL        Appearance: Normal	  HEENT:   Normal oral mucosa, PERRL, EOMI	  Lymphatic: No lymphadenopathy  Cardiovascular: Normal S1 S2, No JVD, No murmurs, No edema  Respiratory: Lungs clear to auscultation	  Psychiatry: A & O x 3, Mood & affect appropriate  Gastrointestinal:  Soft, Non-tender, + BS	  Skin: No rashes, No ecchymoses, No cyanosis	  Neurologic: Non-focal  Extremities: Normal range of motion, No clubbing, cyanosis or edema  Vascular: Peripheral pulses palpable 2+ bilaterally    TELEMETRY: 	    ECG:    ECHO:  STRESS:  CATH:  	  RADIOLOGY:  CXR:  CT:  US:   	  	  LABS:	 	    CARDIAC MARKERS:                                  10.7   12.10 )-----------( 247      ( 06 Jun 2019 07:11 )             35.2     06-06    136  |  101  |  16  ----------------------------<  113<H>  4.1   |  24  |  0.95    Ca    9.0      06 Jun 2019 07:11      proBNP:   Lipid Profile:   HgA1c:   TSH:     ASSESSMENT/PLAN: 	    # post op s complicaitons  +pain  no cp sob palp  please repeat post op ekg    #HTN - not at goal, ideal SBP <130, recommend pain control and increase norvasc to 10mg, currently on:  amLODIPine   Tablet 5 milliGRAM(s) Oral daily  losartan 100 milliGRAM(s) Oral daily

## 2019-06-06 NOTE — DISCHARGE NOTE PROVIDER - NSDCFUADDINST_GEN_ALL_CORE_FT
Please see provided Dr. Costello discharge instructions.  Medication was sent to [facility] 1st floor of [facility].  Follow instructions as provided and take medication as prescribed.

## 2019-06-06 NOTE — PROGRESS NOTE ADULT - ASSESSMENT
A/P: y s/p L RAULITO 6/5  -stable  -posterior hip precautions  -pain control  -PT/WBAT  -diet as tolerated  -f/u labs  -disposition: HPT  -discussed with attending

## 2019-06-06 NOTE — DISCHARGE NOTE PROVIDER - HOSPITAL COURSE
Admitted    Surgery: s/p left posterior hip replacement 6/5/19    Rina-op Antibiotics    Pain control    DVT prophylaxis    OOB/Physical Therapy Admitted    Surgery: s/p left posterior hip replacement 6/5/19    Rina-op Antibiotics    Pain control    DVT prophylaxis    OOB/Physical Therapy        Follow up with your primary care doctor regarding your high blood pressure and your medication change. Amilodipine (Norvasc was changed to 10mg daily).

## 2019-06-06 NOTE — DISCHARGE NOTE NURSING/CASE MANAGEMENT/SOCIAL WORK - NSDCDPATPORTLINK_GEN_ALL_CORE
You can access the [facility], offered by [facility], by registering with the following website: [email or web address]

## 2019-06-06 NOTE — OCCUPATIONAL THERAPY INITIAL EVALUATION ADULT - GENERAL OBSERVATIONS, REHAB EVAL
R hand dominant. Patient cleared for OT by JED Hurd. Patient received semi-supine, NAD, +heplock, +abduction pillow, +SCDs, +heplock.

## 2019-06-07 PROBLEM — M19.90 UNSPECIFIED OSTEOARTHRITIS, UNSPECIFIED SITE: Chronic | Status: ACTIVE | Noted: 2019-06-04

## 2019-06-07 PROBLEM — E66.9 OBESITY, UNSPECIFIED: Chronic | Status: ACTIVE | Noted: 2019-06-04

## 2019-06-07 LAB — SURGICAL PATHOLOGY STUDY: SIGNIFICANT CHANGE UP

## 2019-06-07 PROCEDURE — 72170 X-RAY EXAM OF PELVIS: CPT

## 2019-06-07 PROCEDURE — 97116 GAIT TRAINING THERAPY: CPT

## 2019-06-07 PROCEDURE — 80048 BASIC METABOLIC PNL TOTAL CA: CPT

## 2019-06-07 PROCEDURE — 85027 COMPLETE CBC AUTOMATED: CPT

## 2019-06-07 PROCEDURE — S2900: CPT

## 2019-06-07 PROCEDURE — C1776: CPT

## 2019-06-07 PROCEDURE — 88300 SURGICAL PATH GROSS: CPT

## 2019-06-07 PROCEDURE — 36415 COLL VENOUS BLD VENIPUNCTURE: CPT

## 2019-06-07 PROCEDURE — 97161 PT EVAL LOW COMPLEX 20 MIN: CPT

## 2019-06-11 ENCOUNTER — MEDICATION RENEWAL (OUTPATIENT)
Age: 49
End: 2019-06-11

## 2019-06-11 DIAGNOSIS — M16.12 UNILATERAL PRIMARY OSTEOARTHRITIS, LEFT HIP: ICD-10-CM

## 2019-06-11 DIAGNOSIS — E66.9 OBESITY, UNSPECIFIED: ICD-10-CM

## 2019-06-11 DIAGNOSIS — I10 ESSENTIAL (PRIMARY) HYPERTENSION: ICD-10-CM

## 2019-06-26 ENCOUNTER — FORM ENCOUNTER (OUTPATIENT)
Age: 49
End: 2019-06-26

## 2019-06-27 ENCOUNTER — OUTPATIENT (OUTPATIENT)
Dept: OUTPATIENT SERVICES | Facility: HOSPITAL | Age: 49
LOS: 1 days | End: 2019-06-27
Payer: COMMERCIAL

## 2019-06-27 ENCOUNTER — APPOINTMENT (OUTPATIENT)
Dept: ORTHOPEDIC SURGERY | Facility: CLINIC | Age: 49
End: 2019-06-27
Payer: COMMERCIAL

## 2019-06-27 PROCEDURE — 73502 X-RAY EXAM HIP UNI 2-3 VIEWS: CPT | Mod: 26,LT

## 2019-06-27 PROCEDURE — 73502 X-RAY EXAM HIP UNI 2-3 VIEWS: CPT

## 2019-06-27 PROCEDURE — 99024 POSTOP FOLLOW-UP VISIT: CPT

## 2019-06-27 NOTE — ADDENDUM
[FreeTextEntry1] : The patient’s case was personally discussed with Dr. Costello who was in agreement with the assessment and plan\par \par

## 2019-07-23 ENCOUNTER — APPOINTMENT (OUTPATIENT)
Dept: ORTHOPEDIC SURGERY | Facility: CLINIC | Age: 49
End: 2019-07-23
Payer: COMMERCIAL

## 2019-07-23 DIAGNOSIS — M16.7 OTHER UNILATERAL SECONDARY OSTEOARTHRITIS OF HIP: ICD-10-CM

## 2019-07-23 DIAGNOSIS — Z96.642 AFTERCARE FOLLOWING JOINT REPLACEMENT SURGERY: ICD-10-CM

## 2019-07-23 DIAGNOSIS — Z47.1 AFTERCARE FOLLOWING JOINT REPLACEMENT SURGERY: ICD-10-CM

## 2019-07-23 DIAGNOSIS — Z87.39 PERSONAL HISTORY OF OTHER DISEASES OF THE MUSCULOSKELETAL SYSTEM AND CONNECTIVE TISSUE: ICD-10-CM

## 2019-07-23 PROCEDURE — 99024 POSTOP FOLLOW-UP VISIT: CPT

## 2019-07-23 RX ORDER — IBUPROFEN 800 MG/1
800 TABLET, FILM COATED ORAL
Refills: 0 | Status: DISCONTINUED | COMMUNITY
End: 2019-07-23

## 2019-07-23 RX ORDER — OXYCODONE AND ACETAMINOPHEN 5; 325 MG/1; MG/1
5-325 TABLET ORAL
Qty: 50 | Refills: 0 | Status: DISCONTINUED | COMMUNITY
Start: 2019-06-05 | End: 2019-07-23

## 2019-07-23 RX ORDER — ASPIRIN/ACETAMINOPHEN/CAFFEINE 500-325-65
325 POWDER IN PACKET (EA) ORAL
Qty: 60 | Refills: 0 | Status: DISCONTINUED | COMMUNITY
Start: 2019-06-05 | End: 2019-07-23

## 2019-07-23 RX ORDER — PANTOPRAZOLE 40 MG/1
40 TABLET, DELAYED RELEASE ORAL DAILY
Qty: 30 | Refills: 0 | Status: DISCONTINUED | COMMUNITY
Start: 2019-06-05 | End: 2019-07-23

## 2019-07-23 RX ORDER — CELECOXIB 200 MG/1
200 CAPSULE ORAL TWICE DAILY
Qty: 84 | Refills: 0 | Status: DISCONTINUED | COMMUNITY
Start: 2019-06-05 | End: 2019-07-23

## 2019-07-23 NOTE — END OF VISIT
[FreeTextEntry3] : All medical record entries made by Caio King acting as a scribe for the performing provider (Osmar Costello MD and/or JEREMY Cavanaugh) on 07/23/2019. All entries were dictated to me by the performing medical provider. In signing this record, the medical provider affirms that they have personally performed the history, physical exam, assessment and plan and have also directed, reviewed and agreed to the documentation in the chart.

## 2019-07-23 NOTE — HISTORY OF PRESENT ILLNESS
[Healed] : healed [Clean/Dry/Intact] : clean, dry and intact [Excellent Pain Control] : has excellent pain control [Doing Well] : is doing well [No Sign of Infection] : is showing no signs of infection [Chills] : no chills [Fever] : no fever [de-identified] : Second post op left total hip replacement,surgical date 06/05/19 [de-identified] : No new imaging was reviewed at this time. [de-identified] : 49 year old male presents for second post op s/p left THR 6/5/19. He is doing well overall. He reports some, occasional left hip pain that occurs at night. He also reports slight, weekly right hip pain localized to the buttock that occurs upon rising in the morning. Patient can walk greater than 10 blocks with a slight limp and has difficulty using stairs. He reports he only takes pain medication to help him sleep at night. [de-identified] : Patient is alert and oriented x3.\par Leg lengths clinically equal.\par \par Left hip: Well-healed surgical scar without erythema or ecchymosis. Acceptable post-op swelling. ROM from full extension to 90 degrees of flexion. 10 degrees of internal rotation. 35 degrees of external rotation. 40 degrees of abduction. 5-10 degrees of adduction. \par Calf is soft and nontender.\par NVI.\par \par Right Hip:\par Skin intact. No surgical scars. No erythema. No ecchymosis. No swelling. No deformity. No focal tenderness.\par Painless ROM from full extension to 80 degrees of flexion. 0 degrees of internal rotation. 5 degrees of external rotation. 15-20 degrees of abduction. 0 degrees of adduction.\par No crepitation. No instability. [de-identified] : Mr. MARTINEZ is a 49 year old M doing well s/p left THR 6/5/19. I advised him to wean off of narcotic medication. He should continue to work on improving hip ROM and I showed him some stretches he can do for this. \par Follow up in 2 months with symptoms, one year post surgery or PRN.

## 2019-08-07 ENCOUNTER — TRANSCRIPTION ENCOUNTER (OUTPATIENT)
Age: 49
End: 2019-08-07

## 2019-09-03 ENCOUNTER — FORM ENCOUNTER (OUTPATIENT)
Age: 49
End: 2019-09-03

## 2022-03-15 NOTE — OCCUPATIONAL THERAPY INITIAL EVALUATION ADULT - FINE MOTOR COORDINATION, RIGHT HAND, DIADOCHOKINESIS SKILLS, OT EVAL
No You can access the FollowMyHealth Patient Portal offered by [facility] by registering at the following website: [email or web address]. By joining [employer]’s FollowMyHealth portal, you will also be able to view your health information using other applications (apps) compatible with our system. normal performance

## 2023-07-16 NOTE — HISTORY OF PRESENT ILLNESS
[Doing Well] : is doing well [Excellent Pain Control] : has excellent pain control [No Sign of Infection] : is showing no signs of infection No [Constipation] : no constipation [Chills] : no chills [Diarrhea] : no diarrhea [Dysuria] : no dysuria [Nausea] : no nausea [Fever] : no fever [Vomiting] : no vomiting [de-identified] : First post op visit left total hip replacement, surgical date 6/5/19 [de-identified] : Patient presents for first post op visit s/p left posterior RAULITO 6/5/19. He states he is doing well overall. Pain is well controlled with pain medication. He is ambulating with a cane today and has been walking about 1-2 blocks. He is following posterior precautions. Takes ASA BID for DVT ppx. [de-identified] : X-rays taken today demonstrate well-fixed left total hip replacement in overall good alignment [de-identified] : Patient is alert and oriented x3,\par Leg lengths clinically equal.\par Left hip: Well-healed posterior surgical scar without erythema or ecchymosis. Acceptable post op swelling. ROM from full extension to 85 degrees of flexion, external rotation 40 degrees, internal 0, abduction 45 degrees, adduction 5 degrees. Calf soft and nontender. NVI. Flexor power 4/5. [de-identified] : Advised to gradually increase activity as tolerated. Follow HEP through [facility]. Follow posterior precautions until 6 weeks post op, ASA until 4 weeks post op. Follow up in 4-6 weeks.